# Patient Record
(demographics unavailable — no encounter records)

---

## 2024-12-13 NOTE — DISCUSSION/SUMMARY
[FreeTextEntry1] : Here for WCC, developing well. No acute concerns today. No abnormalities on ROS, history. Issues today include:  - Some weight loss since last year, negative on ROS for fever, night sweats, chills. Physical exam revealing for right cervical LAD today, however recently ill with viral illness. Ordered US neck for further assessment; will reassess node in 1 month to see if any change in appearance or size. Return in 1 month for weight check, vaccines.  -Will refill albuterol for mild intermittent asthma. Well controlled.  - No vaccine record. Father states previous clinic in [place] was called multiple times to release records. UTD on vaccines per father's report. Will defer vaccines for today until record obtained.  - Will order routine bloodwork today - CBC, lipid, Hgb A1C, and CMP today. 1y for next WCC.

## 2024-12-13 NOTE — DISCUSSION/SUMMARY
[FreeTextEntry1] : Here for WCC, developing well. No acute concerns today. No abnormalities on ROS, history. Issues today include:  - Some weight loss since last year, negative on ROS for fever, night sweats, chills. Physical exam revealing for right cervical LAD today, however recently ill with viral illness. Ordered US neck for further assessment; will reassess node in 1 month to see if any change in appearance or size. Return in 1 month for weight check, vaccines.  -Will refill albuterol for mild intermittent asthma. Well controlled.  - No vaccine record. Father states previous clinic in [place] was called multiple times to release records. UTD on vaccines per father's report. Will defer vaccines for today until record obtained.  - Will order routine bloodwork today - CBC, lipid, Hgb A1C, and CMP today. 1y for next WCC.

## 2024-12-13 NOTE — PHYSICAL EXAM
[Alert] : alert [No Acute Distress] : no acute distress [Normocephalic] : normocephalic [Conjunctivae with no discharge] : conjunctivae with no discharge [PERRL] : PERRL [EOMI Bilateral] : EOMI bilateral [Auricles Well Formed] : auricles well formed [Clear Tympanic membranes with present light reflex and bony landmarks] : clear tympanic membranes with present light reflex and bony landmarks [No Discharge] : no discharge [Nares Patent] : nares patent [Pink Nasal Mucosa] : pink nasal mucosa [Palate Intact] : palate intact [Nonerythematous Oropharynx] : nonerythematous oropharynx [Supple, full passive range of motion] : supple, full passive range of motion [Symmetric Chest Rise] : symmetric chest rise [Regular Rate and Rhythm] : regular rate and rhythm [Normal S1, S2 present] : normal S1, S2 present [No Murmurs] : no murmurs [+2 Femoral Pulses] : +2 femoral pulses [Soft] : soft [NonTender] : non tender [Non Distended] : non distended [Normoactive Bowel Sounds] : normoactive bowel sounds [No Hepatomegaly] : no hepatomegaly [No Splenomegaly] : no splenomegaly [Patent] : patent [No fissures] : no fissures [No Abnormal Lymph Nodes Palpated] : no abnormal lymph nodes palpated [No Gait Asymmetry] : no gait asymmetry [No pain or deformities with palpation of bone, muscles, joints] : no pain or deformities with palpation of bone, muscles, joints [Normal Muscle Tone] : normal muscle tone [Straight] : straight [+2 Patella DTR] : +2 patella DTR [Cranial Nerves Grossly Intact] : cranial nerves grossly intact [No Rash or Lesions] : no rash or lesions [de-identified] : right posterior cervical lymphadenopathy about 1cm in size, soft, mobile, nontender [FreeTextEntry7] : expiratory wheezing in LLL [de-identified] : eczema over trunk, behind neck

## 2024-12-13 NOTE — HISTORY OF PRESENT ILLNESS
[Father] : father [Fruit] : fruit [Vegetables] : vegetables [Meat] : meat [Grains] : grains [Eggs] : eggs [Dairy] : dairy [Normal] : Normal [Playtime (60 min/d)] : playtime 60 min a day [Participates in after-school activities] : participates in after-school activities [< 2 hrs of screen time per day] : less than 2 hrs of screen time per day [Appropiate parent-child-sibling interaction] : appropriate parent-child-sibling interaction [Has Friends] : has friends [Has chance to make own decisions] : has chance to make own decisions [Grade ___] : Grade [unfilled] [Parent/teacher concerns] : parent/teacher concerns [Adequate social interactions] : adequate social interactions [Up to date] : Up to date [de-identified] : Last had to use albuterol last month. Only one time use in the past season. [de-identified] : N/A [de-identified] : Per father's attestation

## 2024-12-13 NOTE — HISTORY OF PRESENT ILLNESS
[Father] : father [Fruit] : fruit [Vegetables] : vegetables [Meat] : meat [Grains] : grains [Eggs] : eggs [Dairy] : dairy [Normal] : Normal [Playtime (60 min/d)] : playtime 60 min a day [Participates in after-school activities] : participates in after-school activities [< 2 hrs of screen time per day] : less than 2 hrs of screen time per day [Appropiate parent-child-sibling interaction] : appropriate parent-child-sibling interaction [Has Friends] : has friends [Has chance to make own decisions] : has chance to make own decisions [Grade ___] : Grade [unfilled] [Parent/teacher concerns] : parent/teacher concerns [Adequate social interactions] : adequate social interactions [Up to date] : Up to date [de-identified] : Last had to use albuterol last month. Only one time use in the past season. [de-identified] : N/A [de-identified] : Per father's attestation

## 2024-12-13 NOTE — PHYSICAL EXAM
[Alert] : alert [No Acute Distress] : no acute distress [Normocephalic] : normocephalic [Conjunctivae with no discharge] : conjunctivae with no discharge [PERRL] : PERRL [EOMI Bilateral] : EOMI bilateral [Auricles Well Formed] : auricles well formed [Clear Tympanic membranes with present light reflex and bony landmarks] : clear tympanic membranes with present light reflex and bony landmarks [No Discharge] : no discharge [Nares Patent] : nares patent [Pink Nasal Mucosa] : pink nasal mucosa [Palate Intact] : palate intact [Nonerythematous Oropharynx] : nonerythematous oropharynx [Supple, full passive range of motion] : supple, full passive range of motion [Symmetric Chest Rise] : symmetric chest rise [Regular Rate and Rhythm] : regular rate and rhythm [Normal S1, S2 present] : normal S1, S2 present [No Murmurs] : no murmurs [+2 Femoral Pulses] : +2 femoral pulses [Soft] : soft [NonTender] : non tender [Non Distended] : non distended [Normoactive Bowel Sounds] : normoactive bowel sounds [No Hepatomegaly] : no hepatomegaly [No Splenomegaly] : no splenomegaly [Patent] : patent [No fissures] : no fissures [No Abnormal Lymph Nodes Palpated] : no abnormal lymph nodes palpated [No Gait Asymmetry] : no gait asymmetry [No pain or deformities with palpation of bone, muscles, joints] : no pain or deformities with palpation of bone, muscles, joints [Normal Muscle Tone] : normal muscle tone [Straight] : straight [+2 Patella DTR] : +2 patella DTR [Cranial Nerves Grossly Intact] : cranial nerves grossly intact [No Rash or Lesions] : no rash or lesions [de-identified] : right posterior cervical lymphadenopathy about 1cm in size, soft, mobile, nontender [FreeTextEntry7] : expiratory wheezing in LLL [de-identified] : eczema over trunk, behind neck